# Patient Record
Sex: FEMALE | Race: WHITE | Employment: FULL TIME | URBAN - METROPOLITAN AREA
[De-identification: names, ages, dates, MRNs, and addresses within clinical notes are randomized per-mention and may not be internally consistent; named-entity substitution may affect disease eponyms.]

---
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
## 2021-03-07 ENCOUNTER — OFFICE VISIT (OUTPATIENT)
Dept: URGENT CARE | Facility: URGENT CARE | Age: 21
End: 2021-03-07
Payer: COMMERCIAL

## 2021-03-07 VITALS — WEIGHT: 181 LBS | DIASTOLIC BLOOD PRESSURE: 60 MMHG | HEART RATE: 80 BPM | SYSTOLIC BLOOD PRESSURE: 110 MMHG

## 2021-03-07 DIAGNOSIS — N30.00 ACUTE CYSTITIS WITHOUT HEMATURIA: ICD-10-CM

## 2021-03-07 DIAGNOSIS — R30.0 DYSURIA: Primary | ICD-10-CM

## 2021-03-07 LAB
ALBUMIN UR-MCNC: NEGATIVE MG/DL
APPEARANCE UR: ABNORMAL
BACTERIA #/AREA URNS HPF: ABNORMAL /HPF
BILIRUB UR QL STRIP: NEGATIVE
COLOR UR AUTO: YELLOW
GLUCOSE UR STRIP-MCNC: NEGATIVE MG/DL
HGB UR QL STRIP: ABNORMAL
KETONES UR STRIP-MCNC: NEGATIVE MG/DL
LEUKOCYTE ESTERASE UR QL STRIP: ABNORMAL
NITRATE UR QL: NEGATIVE
NON-SQ EPI CELLS #/AREA URNS LPF: ABNORMAL /LPF
PH UR STRIP: 6 PH (ref 5–7)
RBC #/AREA URNS AUTO: ABNORMAL /HPF
SOURCE: ABNORMAL
SP GR UR STRIP: 1.02 (ref 1–1.03)
UROBILINOGEN UR STRIP-ACNC: 0.2 EU/DL (ref 0.2–1)
WBC #/AREA URNS AUTO: ABNORMAL /HPF

## 2021-03-07 PROCEDURE — 87186 SC STD MICRODIL/AGAR DIL: CPT | Performed by: FAMILY MEDICINE

## 2021-03-07 PROCEDURE — 99203 OFFICE O/P NEW LOW 30 MIN: CPT | Performed by: FAMILY MEDICINE

## 2021-03-07 PROCEDURE — 87086 URINE CULTURE/COLONY COUNT: CPT | Performed by: FAMILY MEDICINE

## 2021-03-07 PROCEDURE — 81001 URINALYSIS AUTO W/SCOPE: CPT | Performed by: FAMILY MEDICINE

## 2021-03-07 PROCEDURE — 87088 URINE BACTERIA CULTURE: CPT | Performed by: FAMILY MEDICINE

## 2021-03-07 RX ORDER — SULFAMETHOXAZOLE/TRIMETHOPRIM 800-160 MG
1 TABLET ORAL 2 TIMES DAILY
Qty: 10 TABLET | Refills: 0 | Status: SHIPPED | OUTPATIENT
Start: 2021-03-07

## 2021-03-08 PROBLEM — M25.561 CHRONIC PAIN OF BOTH KNEES: Status: ACTIVE | Noted: 2018-01-04

## 2021-03-08 PROBLEM — U07.1 COVID-19: Status: ACTIVE | Noted: 2020-12-24

## 2021-03-08 PROBLEM — G89.29 CHRONIC PAIN OF BOTH KNEES: Status: ACTIVE | Noted: 2018-01-04

## 2021-03-08 PROBLEM — Z72.89 ENGAGES IN VAPING: Status: ACTIVE | Noted: 2020-12-24

## 2021-03-08 PROBLEM — S86.309A PERONEAL TENDON INJURY: Status: ACTIVE | Noted: 2021-03-08

## 2021-03-08 PROBLEM — M25.562 CHRONIC PAIN OF BOTH KNEES: Status: ACTIVE | Noted: 2018-01-04

## 2021-03-08 PROBLEM — F33.1 MAJOR DEPRESSIVE DISORDER, RECURRENT EPISODE, MODERATE (H): Status: ACTIVE | Noted: 2018-06-13

## 2021-03-08 RX ORDER — DEXTROAMPHETAMINE SULFATE, DEXTROAMPHETAMINE SACCHARATE, AMPHETAMINE SULFATE AND AMPHETAMINE ASPARTATE 5; 5; 5; 5 MG/1; MG/1; MG/1; MG/1
CAPSULE, EXTENDED RELEASE ORAL
COMMUNITY
Start: 2021-02-23

## 2021-03-08 RX ORDER — FLUOXETINE 40 MG/1
CAPSULE ORAL
COMMUNITY
Start: 2021-02-23

## 2021-03-08 RX ORDER — CLINDAMYCIN PALMITATE HYDROCHLORIDE 75 MG/5ML
SOLUTION ORAL
COMMUNITY
Start: 2020-03-08

## 2021-03-08 RX ORDER — GABAPENTIN 100 MG/1
100 CAPSULE ORAL
COMMUNITY
Start: 2020-12-24 | End: 2021-12-24

## 2021-03-08 RX ORDER — MONTELUKAST SODIUM 10 MG/1
10 TABLET ORAL
COMMUNITY
Start: 2020-12-24

## 2021-03-08 RX ORDER — LEVALBUTEROL TARTRATE 45 UG/1
1-2 AEROSOL, METERED ORAL
COMMUNITY
Start: 2020-12-24

## 2021-03-08 NOTE — PROGRESS NOTES
ASSESSMENT/  PLAN:   Dysuria     - *UA reflex to Microscopic and Culture ([facility] (except [place] and [place])  - Urine Culture Aerobic Bacterial  - Urine Microscopic    Acute cystitis without hematuria     - sulfamethoxazole-trimethoprim (BACTRIM DS) 800-160 MG tablet; Take 1 tablet by mouth 2 times daily     Drink plenty of fluids.  Prevention and treatment of UTI's discussed.Signs and symptoms of pyelonephritis mentioned.  Follow up with primary care physician if not improving    We discussed that a urine culture is being performed and that if we find that if there is a bacteria in the urine that is resistant to the prescribed antibiotic he/she will receive call to change the antibiotic to better cover the infection.          -------------------------------------------------------------------------------------------    SUBJECTIVE:  Chief Complaint   Patient presents with     Dysuria        Estrella Vallejo is a 21 year old female who  presents today for a possible UTI. Symptoms of dysuria, urgency, frequency and burning have been going on for 1day(s).  Hematuria no.  gradual onset, still present and worseningand moderate.  There is no history of fever, chills, nausea or vomiting.  No history of vaginal   discharge. This patient does   have a history of urinary tract infections. Patient denies long duration, rigors, flank pain and temperature > 101 degrees F. or vaginal discharge, vaginal odor and vaginal itching     Patient Active Problem List   Diagnosis     ADHD (attention deficit hyperactivity disorder)     Allergy     Benign essential tremor     COVID-19     Engages in vaping     Chronic pain of both knees     Ligament laxity     Major depressive disorder, recurrent episode, moderate (H)     Mild persistent asthma     Patellofemoral pain syndrome     Peroneal tendon injury     Seasonal allergies     Vitamin D deficiency       ALLERGIES:  Patient has no allergy information on  record.    MEDs  Current Outpatient Medications   Medication     clindamycin (CLEOCIN) 75 MG/5ML solution     etonogestrel (NEXPLANON) 68 MG IMPL     gabapentin (NEURONTIN) 100 MG capsule     levalbuterol (XOPENEX HFA) 45 MCG/ACT inhaler     montelukast (SINGULAIR) 10 MG tablet     sulfamethoxazole-trimethoprim (BACTRIM DS) 800-160 MG tablet     ADDERALL XR 20 MG 24 hr capsule     Calcium Carb-Ergocalciferol 500-200 MG-UNIT TABS     FLUoxetine (PROZAC) 40 MG capsule     No current facility-administered medications for this visit.        ROS:   CONSTITUTIONAL:NEGATIVE for fever, chills   INTEGUMENTARY/SKIN: NEGATIVE for worrisome rashes,  or lesions  EYES: NEGATIVE for vision changes or irritation  RESP:NEGATIVE for significant cough or SOB  GI: NEGATIVE for nausea, abdominal pain , or change in bowel habits    OBJECTIVE:  /60   Pulse 80   Wt 82.1 kg (181 lb)   GENERAL APPEARANCE: healthy, alert and no distress  RESP: lungs clear to auscultation - no rales, rhonchi or wheezes  CV: regular rates and rhythm, normal S1 S2, no murmur noted  ABDOMEN:  soft, nontender, no HSM or masses and bowel sounds normal  BACK: No CVA tenderness  SKIN: no suspicious lesions or rashes      Results for orders placed or performed in visit on 03/07/21   *UA reflex to Microscopic and Culture ([facility] [facility] (except [place] and [place])     Status: Abnormal    Specimen: Midstream Urine   Result Value Ref Range    Color Urine Yellow     Appearance Urine Slightly Cloudy     Glucose Urine Negative NEG^Negative mg/dL    Bilirubin Urine Negative NEG^Negative    Ketones Urine Negative NEG^Negative mg/dL    Specific Gravity Urine 1.025 1.003 - 1.035    Blood Urine Trace (A) NEG^Negative    pH Urine 6.0 5.0 - 7.0 pH    Protein Albumin Urine Negative NEG^Negative mg/dL    Urobilinogen Urine 0.2 0.2 - 1.0 EU/dL    Nitrite Urine Negative NEG^Negative    Leukocyte Esterase Urine Moderate (A) NEG^Negative    Source Midstream  Urine    Urine Microscopic     Status: Abnormal   Result Value Ref Range    WBC Urine 25-50 (A) OTO5^0 - 5 /HPF    RBC Urine 2-5 (A) OTO2^O - 2 /HPF    Squamous Epithelial /LPF Urine Few FEW^Few /LPF    Bacteria Urine Few (A) NEG^Negative /HPF

## 2021-03-09 LAB
BACTERIA SPEC CULT: ABNORMAL
BACTERIA SPEC CULT: ABNORMAL
Lab: ABNORMAL
SPECIMEN SOURCE: ABNORMAL